# Patient Record
Sex: MALE | Race: BLACK OR AFRICAN AMERICAN | URBAN - METROPOLITAN AREA
[De-identification: names, ages, dates, MRNs, and addresses within clinical notes are randomized per-mention and may not be internally consistent; named-entity substitution may affect disease eponyms.]

---

## 2019-02-08 ENCOUNTER — HISTORICAL (OUTPATIENT)
Dept: ADMINISTRATIVE | Facility: HOSPITAL | Age: 19
End: 2019-02-08

## 2019-02-08 LAB
ALBUMIN SERPL-MCNC: 4.1 GM/DL (ref 3.4–5)
ALBUMIN/GLOB SERPL: 1.3 RATIO (ref 1.1–2)
ALP SERPL-CCNC: 86 UNIT/L (ref 45–117)
ALT SERPL-CCNC: 26 UNIT/L (ref 12–78)
AST SERPL-CCNC: 18 UNIT/L (ref 15–37)
BILIRUB SERPL-MCNC: 1.1 MG/DL (ref 0.2–1)
BILIRUBIN DIRECT+TOT PNL SERPL-MCNC: 0.2 MG/DL
BILIRUBIN DIRECT+TOT PNL SERPL-MCNC: 0.9 MG/DL
BUN SERPL-MCNC: 11 MG/DL (ref 7–18)
CALCIUM SERPL-MCNC: 9 MG/DL (ref 8.5–10.1)
CHLORIDE SERPL-SCNC: 107 MMOL/L (ref 98–107)
CO2 SERPL-SCNC: 29 MMOL/L (ref 21–32)
CREAT SERPL-MCNC: 1.1 MG/DL (ref 0.6–1.3)
CRP SERPL-MCNC: <0.3 MG/DL
ERYTHROCYTE [SEDIMENTATION RATE] IN BLOOD: 1 MM/HR (ref 0–15)
GLOBULIN SER-MCNC: 3.1 GM/ML (ref 2.3–3.5)
GLUCOSE SERPL-MCNC: 87 MG/DL (ref 74–106)
POTASSIUM SERPL-SCNC: 4.6 MMOL/L (ref 3.5–5.1)
PROT SERPL-MCNC: 7.2 GM/DL (ref 6.4–8.2)
SODIUM SERPL-SCNC: 140 MMOL/L (ref 136–145)
T4 FREE SERPL-MCNC: 0.95 NG/DL (ref 0.76–1.46)
TSH SERPL-ACNC: 1.38 MIU/L (ref 0.36–3.74)

## 2019-02-14 ENCOUNTER — HISTORICAL (OUTPATIENT)
Dept: ADMINISTRATIVE | Facility: HOSPITAL | Age: 19
End: 2019-02-14

## 2022-04-07 ENCOUNTER — HISTORICAL (OUTPATIENT)
Dept: ADMINISTRATIVE | Facility: HOSPITAL | Age: 22
End: 2022-04-07

## 2022-04-24 VITALS
DIASTOLIC BLOOD PRESSURE: 66 MMHG | HEIGHT: 73 IN | BODY MASS INDEX: 25.71 KG/M2 | OXYGEN SATURATION: 100 % | SYSTOLIC BLOOD PRESSURE: 110 MMHG | WEIGHT: 194 LBS

## 2022-05-01 NOTE — HISTORICAL [facility] CERNER
This is a historical note converted from [employer]. Formatting and pictures may have been removed.  Please reference [employer] for original formatting and attached multimedia. Chief Complaint  c/o nausea and dizziness x 2 days  History of Present Illness  Patient is a 18 year ?Complaints of 2 day history of Nausea and dizziness. Had similar episodes once before 2017. That episode lasted just a day.?Had a headache yesterday, but no headache currently. No V/F/C/tinnitus/Diarrhea. Associated with Decreased appetite x 2 days. Was seen in [facility] yesterday. Symptoms worsened by going from laying down to?sitting up or standing. Says it improves after a minute, but then gets worse the longer he walks. No previous medical problems or medications. No supplements.  Review of Systems  Constitutional: no fever, no chills, no weight loss  CV: no swelling, no edema  Resp: no SOB, wheezing  : no urinary retention, no urinary incontinence  GI: no fecal incontinence  Skin: no rash, no wound  Neuro: no numbness/tingling, no weakness, no saddle anesthesia  MSK: as above  Psych: no depression, no anxiety  Heme/Lymph: no easy bruising, no easy bleeding, no lymphadenopathy  Immuno: no MRSA history  ?  Physical Exam  Vitals & Measurements  T:?36.7? ?C (Oral)? HR:?57(Peripheral)? RR:?16? BP:?120/81? SpO2:?100%?  HT:?186?cm? WT:?86.20?kg? BMI:?24.92?  ?  General:?well developed; well nourished; cooperative  PSYCH: alert and oriented with?appropriate mood and affect  HEENT: PERRLA, no papilledema or hemorrhages noted b/l on fundoscopic exam. (-)LAD, pharyngeal erythema or exudate, no tonsillar enlargement.  SKIN: inspection and palpation of skin and soft tissue normal; no scars noted on upper/lower extremities  CV: RRR, No murmur; vascular integrity noted; +2 symmetrical pulses, no edema  RESP: LCTAB; No acute resp distress, No audible wheeze, No accessory muscle use.  ABD: SNTND+BS. (-)hepato/splenomegaly.  NEURO: sensation intact by light  touch; DTRs +2 bilateral and symmetrical,? Motor 5/5 b/l upper; Romberg not performed d/t acute dizziness.  LYMPH: no LAD noted  Assessment/Plan  1.?Vertigo?R42  - Will begin workup for acute vertigo.  - Rx: will consider meclizine or zofran once work up is complete, as this can afffect test results.  - Labs: CBC, CMP, TSH, FT4, ESR, CRP.  - Imaging: CT Head?with and without contrast.  - Special tests: VEMP and Audiogram.  - Referral: ENT, Audiology.  ?  Ordered:  Comprehensive Metabolic Panel, Routine collect, 02/08/19 3:00:00 CST, Blood, Stop date 02/08/19 13:23:00 CST, Lab Collect, Vertigo, 02/08/19 3:00:00 CST  CRP, Routine collect, 02/08/19 13:18:00 CST, Blood, Stop date 02/08/19 13:23:00 CST, Lab Collect, Vertigo, 02/08/19 13:18:00 CST  CT Head W W/O Contrast, Routine, 02/08/19 13:20:00 CST, Dizziness , vertigo, None, Ambulatory, Rad Type, Vertigo, Schedule this test, [facility], 02/08/19 13:20:00 CST  Free T4, Routine collect, 02/08/19 3:00:00 CST, Blood, Stop date 02/08/19 13:23:00 CST, Lab Collect, Vertigo, 02/08/19 3:00:00 CST  Free T4, Routine collect, 02/09/19 5:00:00 CST, Blood, Stop date 02/09/19 5:00:00 CST, Lab Collect, Vertigo, 02/09/19 5:00:00 CST  Internal Referral to Audiology, VEMP, 02/08/19 13:20:00 CST, UL dharmesh with acute vertigo that Dr. Aguirre talked to Dr. Orantes about., Vertigo  Internal Referral to ENT, vertigo, 03/08/19 3:00:00 CST, UL dharmesh with acute vertigo that Dr. Aguirre talked to Dr. Orantes about., Vertigo  Sedimentation Rate, Routine collect, 02/08/19 13:18:00 CST, Blood, Stop date 02/08/19 13:23:00 CST, Lab Collect, Vertigo, 02/08/19 13:18:00 CST  Thyroid Stimulating Hormone, Routine collect, 02/08/19 3:00:00 CST, Blood, Stop date 02/08/19 13:23:00 CST, Lab Collect, Vertigo, 02/08/19 3:00:00 CST  ?   Problem List/Past Medical History  Ongoing  No qualifying data  Historical  No qualifying data  Medications  No active medications  Allergies  No Known  Allergies  Social History  Alcohol  Never, 02/08/2019  Employment/School  Student, 02/08/2019  Exercise  Exercise duration: 60. Exercise frequency: 5-6 times/week. Exercise type: Running, Weight lifting., 02/08/2019  Home/Environment  Lives with 2 friends. Living situation: Home/Independent. Alcohol abuse in household: No. Substance abuse in household: No. Smoker in household: No., 02/08/2019  Nutrition/Health  Type of diet: good. Regular, Caffeine intake amount: 0., 02/08/2019  Sexual  Sexually active: Yes. Sexually active at age 15 Years. Number of current partners 1. Number of lifetime partners 4. Sexual orientation: Straight or heterosexual. Uses condoms: Yes. History of sexual abuse: No. Gender Identity Identifies as male., 02/08/2019  Substance Abuse  Never, 02/08/2019  Tobacco  Never (less than 100 in lifetime), N/A, 02/08/2019  Family History  Family history is negative      Recommend no driving while remains symptomatic.   Discussed with resident at the time of visit. Patient reports his symptoms feel like he is spinning.? History of Present Illness, Physical Exam, and Assessment and Plan reviewed. Treatment plan is reasonable and appropriate. CN 2-12 intact.? Dejon-Hallpik postive to the left.? No nystagmus noted. - Sakina May MD MPH